# Patient Record
Sex: MALE | Race: WHITE | NOT HISPANIC OR LATINO
[De-identification: names, ages, dates, MRNs, and addresses within clinical notes are randomized per-mention and may not be internally consistent; named-entity substitution may affect disease eponyms.]

---

## 2018-08-24 PROBLEM — Z00.00 ENCOUNTER FOR PREVENTIVE HEALTH EXAMINATION: Status: ACTIVE | Noted: 2018-08-24

## 2018-10-12 ENCOUNTER — APPOINTMENT (OUTPATIENT)
Dept: PULMONOLOGY | Facility: CLINIC | Age: 43
End: 2018-10-12

## 2019-02-05 ENCOUNTER — EMERGENCY (EMERGENCY)
Facility: HOSPITAL | Age: 44
LOS: 1 days | Discharge: HOME | End: 2019-02-05
Attending: EMERGENCY MEDICINE | Admitting: EMERGENCY MEDICINE
Payer: COMMERCIAL

## 2019-02-05 VITALS
HEART RATE: 80 BPM | DIASTOLIC BLOOD PRESSURE: 96 MMHG | RESPIRATION RATE: 17 BRPM | SYSTOLIC BLOOD PRESSURE: 159 MMHG | TEMPERATURE: 98 F | OXYGEN SATURATION: 100 %

## 2019-02-05 PROCEDURE — 99283 EMERGENCY DEPT VISIT LOW MDM: CPT

## 2019-02-05 PROCEDURE — 99283 EMERGENCY DEPT VISIT LOW MDM: CPT | Mod: 25

## 2019-02-05 PROCEDURE — 96372 THER/PROPH/DIAG INJ SC/IM: CPT

## 2019-02-05 RX ORDER — DIAZEPAM 5 MG
1 TABLET ORAL
Qty: 9 | Refills: 0 | OUTPATIENT
Start: 2019-02-05 | End: 2019-02-07

## 2019-02-05 RX ORDER — OXYCODONE AND ACETAMINOPHEN 5; 325 MG/1; MG/1
2 TABLET ORAL ONCE
Qty: 0 | Refills: 0 | Status: DISCONTINUED | OUTPATIENT
Start: 2019-02-05 | End: 2019-02-05

## 2019-02-05 RX ORDER — KETOROLAC TROMETHAMINE 30 MG/ML
60 SYRINGE (ML) INJECTION ONCE
Qty: 0 | Refills: 0 | Status: DISCONTINUED | OUTPATIENT
Start: 2019-02-05 | End: 2019-02-05

## 2019-02-05 RX ORDER — OXYCODONE AND ACETAMINOPHEN 5; 325 MG/1; MG/1
1 TABLET ORAL ONCE
Qty: 0 | Refills: 0 | Status: DISCONTINUED | OUTPATIENT
Start: 2019-02-05 | End: 2019-02-05

## 2019-02-05 RX ORDER — DIAZEPAM 5 MG
5 TABLET ORAL ONCE
Qty: 0 | Refills: 0 | Status: DISCONTINUED | OUTPATIENT
Start: 2019-02-05 | End: 2019-02-05

## 2019-02-05 RX ORDER — DIAZEPAM 5 MG
10 TABLET ORAL ONCE
Qty: 0 | Refills: 0 | Status: DISCONTINUED | OUTPATIENT
Start: 2019-02-05 | End: 2019-02-05

## 2019-02-05 RX ADMIN — Medication 5 MILLIGRAM(S): at 05:45

## 2019-02-05 RX ADMIN — Medication 60 MILLIGRAM(S): at 05:41

## 2019-02-05 RX ADMIN — OXYCODONE AND ACETAMINOPHEN 1 TABLET(S): 5; 325 TABLET ORAL at 05:40

## 2019-02-05 NOTE — ED PROVIDER NOTE - OBJECTIVE STATEMENT
44yo male who presents with back pain for 3 days. pt states he got out of his truck 3 days ago and twisted his back, right sided low back, constant, non radiating, no tingling or weakness, pt saw his chiropractor which made it worse then was seen at urgent care had xr that was normal and given flexeril with no relief. pt has appt with his orthopedist friday but could not wait

## 2019-02-05 NOTE — ED ADULT NURSE NOTE - NSIMPLEMENTINTERV_GEN_ALL_ED
Implemented All Fall Risk Interventions:  [place] to call system. Call bell, personal items and telephone within reach. Instruct patient to call for assistance. Room bathroom lighting operational. Non-slip footwear when patient is off stretcher. Physically safe environment: no spills, clutter or unnecessary equipment. Stretcher in lowest position, wheels locked, appropriate side rails in place. Provide visual cue, wrist band, yellow gown, etc. Monitor gait and stability. Monitor for mental status changes and reorient to person, place, and time. Review medications for side effects contributing to fall risk. Reinforce activity limits and safety measures with patient and family.

## 2019-02-05 NOTE — ED ADULT NURSE NOTE - OBJECTIVE STATEMENT
Pt complains of back pain, recent mechanical slip and fall, and is supposed to see pain clinic this week but can't wait. States he has a history of back pain.

## 2019-02-05 NOTE — ED ADULT TRIAGE NOTE - CHIEF COMPLAINT QUOTE
patient states "I twisted my back getting out of my truck sunday and went to my chiropractor after it happened and monday too, and I went into a walkin clinic and they gave me a muscle relaxer, and I have an appointment friday for pain management but I cant wait."

## 2019-03-13 PROBLEM — M54.9 DORSALGIA, UNSPECIFIED: Chronic | Status: ACTIVE | Noted: 2019-02-05

## 2019-03-24 ENCOUNTER — TRANSCRIPTION ENCOUNTER (OUTPATIENT)
Age: 44
End: 2019-03-24

## 2019-03-25 ENCOUNTER — OUTPATIENT (OUTPATIENT)
Dept: OUTPATIENT SERVICES | Facility: HOSPITAL | Age: 44
LOS: 1 days | End: 2019-03-25
Payer: COMMERCIAL

## 2019-03-25 DIAGNOSIS — M47.896 OTHER SPONDYLOSIS, LUMBAR REGION: ICD-10-CM

## 2019-03-25 PROCEDURE — 64494 INJ PARAVERT F JNT L/S 2 LEV: CPT | Mod: 50

## 2019-03-25 PROCEDURE — 64493 INJ PARAVERT F JNT L/S 1 LEV: CPT | Mod: 50

## 2019-03-25 PROCEDURE — 76000 FLUOROSCOPY <1 HR PHYS/QHP: CPT

## 2019-03-28 NOTE — ED ADULT NURSE NOTE - NSFALLRSKPASTHIST_ED_ALL_ED
Patient received 1g of intramuscular Ceftriaxone in ER.   Patient will need to complete an outpatient course of UTI treatment (Discussed with Dr. Gonsalez)  Return to the ER if patient develops any new or worsening symptoms such as fever, chills, abdominal pain, confusion, chest pain, SOB, numbness, weakness, nausea, vomiting, or visual changes.
yes

## 2019-12-11 ENCOUNTER — APPOINTMENT (OUTPATIENT)
Dept: PULMONOLOGY | Facility: CLINIC | Age: 44
End: 2019-12-11

## 2021-06-28 ENCOUNTER — RESULT REVIEW (OUTPATIENT)
Age: 46
End: 2021-06-28

## 2021-08-26 ENCOUNTER — RESULT REVIEW (OUTPATIENT)
Age: 46
End: 2021-08-26

## 2022-08-15 NOTE — ED ADULT NURSE NOTE - RADIATION
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

## 2022-11-17 ENCOUNTER — APPOINTMENT (OUTPATIENT)
Dept: ORTHOPEDIC SURGERY | Facility: CLINIC | Age: 47
End: 2022-11-17

## 2022-11-17 VITALS — BODY MASS INDEX: 25.48 KG/M2 | WEIGHT: 172 LBS | HEIGHT: 69 IN

## 2022-11-17 DIAGNOSIS — F17.210 NICOTINE DEPENDENCE, CIGARETTES, UNCOMPLICATED: ICD-10-CM

## 2022-11-17 DIAGNOSIS — F39 UNSPECIFIED MOOD [AFFECTIVE] DISORDER: ICD-10-CM

## 2022-11-17 DIAGNOSIS — Z86.69 PERSONAL HISTORY OF OTHER DISEASES OF THE NERVOUS SYSTEM AND SENSE ORGANS: ICD-10-CM

## 2022-11-17 DIAGNOSIS — R45.4 IRRITABILITY AND ANGER: ICD-10-CM

## 2022-11-17 PROCEDURE — 73010 X-RAY EXAM OF SHOULDER BLADE: CPT | Mod: LT

## 2022-11-17 PROCEDURE — 20611 DRAIN/INJ JOINT/BURSA W/US: CPT

## 2022-11-17 PROCEDURE — 99213 OFFICE O/P EST LOW 20 MIN: CPT | Mod: 25

## 2022-11-17 PROCEDURE — J3490M: CUSTOM

## 2022-11-17 PROCEDURE — 73030 X-RAY EXAM OF SHOULDER: CPT | Mod: LT

## 2022-11-17 RX ORDER — METHYLPREDNISOLONE 4 MG/1
4 TABLET ORAL
Qty: 1 | Refills: 0 | Status: ACTIVE | COMMUNITY
Start: 2022-11-17 | End: 1900-01-01

## 2022-11-17 RX ORDER — CEFDINIR 300 MG/1
300 CAPSULE ORAL
Qty: 14 | Refills: 0 | Status: ACTIVE | COMMUNITY
Start: 2022-09-21

## 2022-11-17 RX ORDER — METHOCARBAMOL 750 MG/1
750 TABLET, FILM COATED ORAL
Qty: 40 | Refills: 0 | Status: ACTIVE | COMMUNITY
Start: 2022-11-10

## 2022-11-17 RX ORDER — ALPRAZOLAM 0.5 MG/1
0.5 TABLET ORAL
Qty: 30 | Refills: 0 | Status: ACTIVE | COMMUNITY
Start: 2022-11-14

## 2022-11-17 RX ORDER — PERPHENAZINE 2 MG/1
2 TABLET ORAL
Qty: 60 | Refills: 0 | Status: ACTIVE | COMMUNITY
Start: 2022-07-29

## 2022-11-17 RX ORDER — LITHIUM CARBONATE 300 MG/1
300 CAPSULE ORAL
Qty: 60 | Refills: 0 | Status: ACTIVE | COMMUNITY
Start: 2022-11-07

## 2022-11-17 RX ORDER — GUANFACINE 1 MG/1
1 TABLET ORAL
Qty: 30 | Refills: 0 | Status: ACTIVE | COMMUNITY
Start: 2022-09-27

## 2022-11-17 RX ORDER — DIVALPROEX SODIUM 500 MG/1
500 TABLET, DELAYED RELEASE ORAL
Qty: 90 | Refills: 0 | Status: ACTIVE | COMMUNITY
Start: 2022-11-07

## 2022-11-17 RX ORDER — OXYCODONE AND ACETAMINOPHEN 5; 325 MG/1; MG/1
5-325 TABLET ORAL
Qty: 42 | Refills: 0 | Status: ACTIVE | COMMUNITY
Start: 2022-09-21

## 2022-11-17 RX ORDER — GUANFACINE 2 MG/1
2 TABLET, EXTENDED RELEASE ORAL
Qty: 30 | Refills: 0 | Status: ACTIVE | COMMUNITY
Start: 2022-11-07

## 2022-11-17 NOTE — HISTORY OF PRESENT ILLNESS
[7] : 7 [Throbbing] : throbbing [Tightness] : tightness [Constant] : constant [Full time] : Work status: full time [de-identified] : 11/17/22: 48 yo RHD male with left shoulder pain since 11/10/22. Denies specfic injury. He woke up with severe pain. He has trouble raising his arm. He went to  and was given muscle relaxer with minimal relief. He had increased pain and went to ED. He still cannot move his arm without pain. No prior injuries. [] : no [FreeTextEntry1] : L shoulder [FreeTextEntry5] : no injury

## 2022-11-17 NOTE — ASSESSMENT
[FreeTextEntry1] : L calcific tendonitis.\par Activity modification.\par Ice.\par L SA injection given.\par MDP\par RTO 3-4 weeks.\par \par \par Procedure Note:\par Large Joint Injection was performed because of pain and inflammation, failure of conservative treatment.  \par Medications:\par Depo-Medrol: 1 cc, 80 mg.\par Lidocaine: 2 cc, 1%. \par Marcaine: 2 cc, .25%. \par \par Medication was injected in the left subacromial space. Patient has tried OTC's including aspirin, Ibuprofen, Aleve etc or prescription NSAIDs, and/or exercises at home and/ or physical therapy without satisfactory response. The risks, benefits, and alternatives to cortisone injection were explained in full to the patient. Risks outlined include but are not limited to infection, sepsis, bleeding, scarring, skin discoloration, temporary increase in pain, syncopal episode, failure to resolve symptoms, allergic reaction, symptom recurrence, and elevation of blood sugar in diabetics. Patient understood the risks. All questions were answered. After discussion of options, patient requested an injection. Oral informed consent was obtained and sterile prep of the injection site was performed using alcohol. Sterile technique was utilized for the procedure including the preparation of the solutions used for the injection. Ethyl chloride spray was used topically.  Sterile technique used. Patient tolerated procedure well. Post Procedure Instructions: Patient was advised to call if redness, pain, or fever occur and apply ice for 15 min. out of every hour for the next 12-24 hours as tolerated. patient was advised to rest the joint(s) for 2 days.\par \par Ultrasound Guidance was used for the following reasons: for prior failure or difficult injection and to visualize tearing and inflammation.\par Ultrasound guided injection was performed of the shoulder, visualization of the needle and placement of injection was performed without complication.\par

## 2022-12-01 ENCOUNTER — APPOINTMENT (OUTPATIENT)
Dept: ORTHOPEDIC SURGERY | Facility: CLINIC | Age: 47
End: 2022-12-01

## 2022-12-01 VITALS — HEIGHT: 69 IN | BODY MASS INDEX: 25.48 KG/M2 | WEIGHT: 172 LBS

## 2022-12-01 DIAGNOSIS — Z78.9 OTHER SPECIFIED HEALTH STATUS: ICD-10-CM

## 2022-12-01 PROCEDURE — 99213 OFFICE O/P EST LOW 20 MIN: CPT

## 2022-12-01 PROCEDURE — 72170 X-RAY EXAM OF PELVIS: CPT

## 2022-12-01 PROCEDURE — 72100 X-RAY EXAM L-S SPINE 2/3 VWS: CPT

## 2022-12-01 PROCEDURE — 73590 X-RAY EXAM OF LOWER LEG: CPT | Mod: RT

## 2022-12-01 RX ORDER — METHYLPREDNISOLONE 4 MG/1
4 TABLET ORAL
Qty: 1 | Refills: 0 | Status: ACTIVE | COMMUNITY
Start: 2022-12-01 | End: 1900-01-01

## 2022-12-01 RX ORDER — GABAPENTIN 100 MG/1
100 CAPSULE ORAL
Qty: 60 | Refills: 0 | Status: ACTIVE | COMMUNITY
Start: 2022-12-01 | End: 1900-01-01

## 2022-12-01 NOTE — DISCUSSION/SUMMARY
[de-identified] : Pain is likely radicular, will start MDP, Gabapentin for the night pain, f/u 10 days

## 2022-12-01 NOTE — HISTORY OF PRESENT ILLNESS
[Right Leg] : right leg [8] : 8 [5] : 5 [Dull/Aching] : dull/aching [Localized] : localized [Throbbing] : throbbing [Meds] : meds [Standing] : standing [Walking] : walking [de-identified] : 12-1-22- He states 5 day history of pain lateral righ lower leg radiating from knee to ankle. denies injury. Pain worse at night. He has been limping. denies specific back pain [] : no [FreeTextEntry3] : 5 days [FreeTextEntry5] : pt stated he has been having constant pain in his right calf with nni

## 2022-12-01 NOTE — IMAGING
[de-identified] : right lower leg- burning pain from proximal tibia region laterally to the ankle, no calf swelling or ttp,  [FreeTextEntry1] : straightening and narrowing 4-5 and 5-1

## 2022-12-01 NOTE — PHYSICAL EXAM
[Flexion] : flexion [Extension] : extension [Bending to left] : bending to left [Bending to right] : bending to right [Disc space narrowing] : Disc space narrowing [Scoliosis] : Scoliosis [AP] : anteroposterior [Right] : right tibia/fibula [There are no fractures, subluxations or dislocations. No significant abnormalities are seen] : There are no fractures, subluxations or dislocations. No significant abnormalities are seen [] : negative straight leg raise

## 2022-12-06 ENCOUNTER — APPOINTMENT (OUTPATIENT)
Dept: MRI IMAGING | Facility: CLINIC | Age: 47
End: 2022-12-06

## 2022-12-06 ENCOUNTER — APPOINTMENT (OUTPATIENT)
Dept: ORTHOPEDIC SURGERY | Facility: CLINIC | Age: 47
End: 2022-12-06

## 2022-12-06 PROCEDURE — 99213 OFFICE O/P EST LOW 20 MIN: CPT

## 2022-12-06 NOTE — IMAGING
[de-identified] : right lower leg- burning pain from proximal tibia region laterally to the ankle, no calf swelling or ttp,  [FreeTextEntry1] : straightening and narrowing 4-5 and 5-1

## 2022-12-06 NOTE — PHYSICAL EXAM
[Flexion] : flexion [Extension] : extension [Bending to left] : bending to left [Bending to right] : bending to right [Disc space narrowing] : Disc space narrowing [Scoliosis] : Scoliosis [AP] : anteroposterior [Right] : right tibia/fibula [There are no fractures, subluxations or dislocations. No significant abnormalities are seen] : There are no fractures, subluxations or dislocations. No significant abnormalities are seen [Diminished] : patella reflex diminished [] : negative straight leg raise [de-identified] : weakness Right hip flexion, slr resisted 3/5 [de-identified] : he cannot heel toe rise on the right

## 2022-12-06 NOTE — ASSESSMENT
[FreeTextEntry1] : L calcific tendonitis.\par Activity modification.\par Ice.\par L SA injection given 11/17/22.\par MDP\par RTO 3-4 weeks.\par \par

## 2022-12-06 NOTE — HISTORY OF PRESENT ILLNESS
[7] : 7 [Throbbing] : throbbing [Tightness] : tightness [Constant] : constant [Full time] : Work status: full time [de-identified] : Error [] : no [FreeTextEntry1] : L shoulder

## 2022-12-06 NOTE — DISCUSSION/SUMMARY
[de-identified] : Radicular RLE pain increased, RLE weakness, no relief with MDP and Gabapentin\par STAT MRI Lumbar spine, evaluate cord compression/stenosis\par Return with pain mgmt for review and possible spine injections

## 2022-12-06 NOTE — HISTORY OF PRESENT ILLNESS
[Right Leg] : right leg [8] : 8 [5] : 5 [Dull/Aching] : dull/aching [Localized] : localized [Throbbing] : throbbing [Meds] : meds [Standing] : standing [Walking] : walking [de-identified] : 12/6/22- here for follow up radicular RLE pain and symptoms, increased. No relief with Gabapentin 900 mg bid and MDP. Tylenol prn has not been helpful.  Pain to FWB RLE. R leg weakness. Pain with B/B. Difficulty sitting, standing and lying short periods. There are night symptoms. Similar episode 5 years ago.  He saw Dr. Yarely coyle mgmt, multiple BRUNA's and ablations were done. 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 denies injury. Pain worse at night. He has been limping. denies specific back pain [] : no [FreeTextEntry3] : 5 days [FreeTextEntry5] : pt stated he has been having constant pain in his right calf with nni

## 2022-12-07 ENCOUNTER — APPOINTMENT (OUTPATIENT)
Dept: PAIN MANAGEMENT | Facility: CLINIC | Age: 47
End: 2022-12-07

## 2022-12-07 ENCOUNTER — RESULT REVIEW (OUTPATIENT)
Age: 47
End: 2022-12-07

## 2022-12-07 VITALS — BODY MASS INDEX: 25.48 KG/M2 | HEIGHT: 69 IN | WEIGHT: 172 LBS

## 2022-12-07 PROCEDURE — 99204 OFFICE O/P NEW MOD 45 MIN: CPT

## 2022-12-07 NOTE — HISTORY OF PRESENT ILLNESS
[Right Leg] : right leg [10] : 10 [Sharp] : sharp [Throbbing] : throbbing [Constant] : constant [Sleep] : sleep [Rest] : rest [Meds] : meds [Nothing helps with pain getting better] : Nothing helps with pain getting better [Sitting] : sitting [Walking] : walking [FreeTextEntry1] : 12/07/2022 : Patient presents for initial evaluation. He c/o pain in the right leg for the last 3 weeks or so.  Has years of lower back pain and had injections with Dr. Pritchett. This pain is new. Medrol did not help. gabapentin did not help. pain down the right lateral leg.  \par \par Subjective Weakness: No\par Numbness/Tingling: Yes\par Bladder/Bowel dysfunction: No\par Physical Therapy: No\par \par \par Attempted modalities for current pain complaint:\par See above:\par Medications: No\par \par Injections: Yes-> Dr. Pritchett Had BRUNA's and RFA's. (last seen a year ago)\par \par Previous Spine Surgery: N/A\par \par Imaging:\par MRI Lumbar Spine (12/7/22) IMPRESSION: Grade 1 retrolisthesis of L2 on L3, L3 on L4 and L5 on S1.\par \par Interval progression of Modic type I endplate changes at L2-L3 and L4 on L5.\par \par Stable disc bulges contributing to mild to moderate spinal canal stenosis and\par mild to moderate neural foraminal narrowing most pronounced at L3-L4 on the\par right.\par \par   [] : no [FreeTextEntry7] : down to the ankle  [de-identified] : MRI lumbar 12/7/22

## 2022-12-07 NOTE — PHYSICAL EXAM
[Extension] : extension [4___] : right hip flexion 4[unfilled]/5 [] : positive sitting straight leg raise [TWNoteComboBox7] : forward flexion 75 degrees [de-identified] : extension 5 degrees

## 2022-12-07 NOTE — ASSESSMENT
[FreeTextEntry1] : After discussing various treatment options with the patient including but not limited to oral medications, physical therapy, exercise, modalities as well as interventional spinal injections, we have decided with the following plan:\par \par 1) Intervention Injection Therapy:\par I personally reviewed the MRI/CT scan images and agree with the radiologist's report. The radiological findings were discussed with the patient.\par The risks, benefits, contents and alternatives to injection were explained in full to the patient. Risks outlined include but are not limited to infection,sepsis, bleeding, post-dural puncture headache, nerve damage, temporary increase in pain, syncopal episode, failure to resolve symptoms, allergic reaction, symptom recurrence, and elevation of blood sugar in diabetics. Cortisone may cause immunosuppression. Patient understands the risks. All questions were answered. After discussion of options, patient requested an injection. Information regarding the injection was given to the patient. Which medications to stop prior to the injection was explained to the patient as well.\par \par Follow up in 1-2 weeks post injection for re-evaluation. \par Continue Home exercises, stretching, activity modification, physical therapy, and conservative care.\par \par Patient is presenting with acute/sub-acute radicular pain with impairment in ADLs and functionality.  The pain has not responded to  conservative care including nsaid therapy and/or physical therapy.  There is no bleeding tendency, unstable medical condition, or systemic infection. \par \par right L3/4 L4/5 TFESI\par \par \par \par

## 2022-12-12 ENCOUNTER — APPOINTMENT (OUTPATIENT)
Dept: PAIN MANAGEMENT | Facility: CLINIC | Age: 47
End: 2022-12-12

## 2022-12-12 PROCEDURE — 64484 NJX AA&/STRD TFRM EPI L/S EA: CPT | Mod: 59,RT

## 2022-12-12 PROCEDURE — 64483 NJX AA&/STRD TFRM EPI L/S 1: CPT | Mod: RT

## 2022-12-12 NOTE — PROCEDURE
[FreeTextEntry3] : Date of Service: 12/12/2022 \par \par Account: [id number]\par \par Patient: DAYSI BURNETT \par \par YOB: 1975\par \par Age: 47 year\par \par \par Surgeon:  Ronit Vyas M.D.\par \par Assistant: None.\par \par Pre-Operative Diagnosis: Lumbosacral Radiculitis (M54.17)\par \par Post Operative Diagnosis: Lumbosacral Radiculitis (M54.17)\par \par Procedure: Right L3-4, L4-5 transforaminal epidural steroid injection under fluoroscopic guidance.\par \par Anesthesia:                 MAC\par \par \par This procedure was carried out using fluoroscopic guidance.  The risks and benefits of the procedure were discussed extensively with the patient.  The consent of the patient was obtained and the following procedure was performed. The patient was placed in the prone position on the fluoroscopic table and the lumbar area was prepped and draped in a sterile fashion.\par \par The right L4-5 neural foramen was identified on right oblique  "steve dog" anatomical view at the 6 o' clock position using fluoroscopic guidance, and the area was marked. The overlying skin and subcutaneous structures were anesthetized using sterile technique with 1% Lidocaine.  A 22 gauge spinal needle was directed toward the inferior (6 o'clock) position of the pedicle, which formed the roof of the identified foramen.  Once in the epidural space, after negative aspiration for heme and CSF, 1cc of Omnipaque contrast was injected to confirm epidural location and assess filling defects and rule out intravascular needle placement. \par \par Lumbar Epidurogram showed no intravascular or intrathecal flow pattern.  No blood or CSF was aspirated. Omnipaque spread medially in epidural space and  outlined the exiting nerve root.  \par \par After this, an injectate of 3 cc preservative free normal saline plus 40 mg of Kenalog was injected in the epidural space.\par \par The right L3-4 neural foramen was then identified on right oblique  "steve dog" anatomical view at the 6 o' clock position using fluoroscopic guidance, and the area was marked. The overlying skin and subcutaneous structures were anesthetized using sterile technique with 1% Lidocaine.  A 22 gauge spinal needle was directed toward the inferior (6 o'clock) position of the pedicle, which formed the roof of the identified foramen.  Once in the epidural space, after negative aspiration for heme and CSF, 1cc of Omnipaque contrast was injected to confirm epidural location and assess filling defects and rule out intravascular needle placement. \par \par Lumbar Epidurogram showed no intravascular or intrathecal flow pattern.  No blood or CSF was aspirated. Omnipaque spread medially in epidural space and  outlined the exiting nerve root.  \par \par After this, an injectate of 3 cc preservative free normal saline plus 40 mg of Kenalog was injected in the epidural space.\par \par The needle was subsequently removed.  Vital signs remained normal.  Pulse oximeter was used throughout the procedure and the patient's pulse and oxygen saturation remained within normal limits.  The patient tolerated the procedure well.  There were no complications.  The patient was instructed to apply ice over the injection sites for twenty minutes every two hours for the next 24 to 48 hours.  The patient was also instructed to contact me immediately if there were any problems.\par \par Ronit Vyas M.D.

## 2022-12-15 ENCOUNTER — APPOINTMENT (OUTPATIENT)
Age: 47
End: 2022-12-15

## 2022-12-28 ENCOUNTER — APPOINTMENT (OUTPATIENT)
Dept: PAIN MANAGEMENT | Facility: CLINIC | Age: 47
End: 2022-12-28

## 2022-12-28 VITALS — WEIGHT: 172 LBS | BODY MASS INDEX: 25.48 KG/M2 | HEIGHT: 69 IN

## 2022-12-28 PROCEDURE — 99214 OFFICE O/P EST MOD 30 MIN: CPT

## 2022-12-28 RX ORDER — OXYCODONE AND ACETAMINOPHEN 5; 325 MG/1; MG/1
5-325 TABLET ORAL
Qty: 28 | Refills: 0 | Status: ACTIVE | COMMUNITY
Start: 2022-12-28 | End: 1900-01-01

## 2022-12-28 NOTE — HISTORY OF PRESENT ILLNESS
[Right Leg] : right leg [10] : 10 [Sharp] : sharp [Throbbing] : throbbing [Constant] : constant [Sleep] : sleep [Rest] : rest [Meds] : meds [Nothing helps with pain getting better] : Nothing helps with pain getting better [Sitting] : sitting [Walking] : walking [Lower back] : lower back [6] : 6 [Injection therapy] : injection therapy [FreeTextEntry1] : 12/28/22: follow up today after Right L3-4,L4-5 TFESI on 12/12/22 with 50% relief. Pain still down the leg. Pain worse at night. Advised him that the he can not take so many Tylenol.  He is adamant that he did not fill 160 pills.  \par \par 12/07/2022 : Patient presents for initial evaluation. He c/o pain in the right leg for the last 3 weeks or so.  Has years of lower back pain and had injections with Dr. Pritchett. This pain is new. Medrol did not help. gabapentin did not help. pain down the right lateral leg.  \par \par Subjective Weakness: No\par Numbness/Tingling: Yes\par Bladder/Bowel dysfunction: No\par Physical Therapy: No\par \par \par Attempted modalities for current pain complaint:\par See above:\par Medications: No\par \par Injections: Yes-> Dr. Pritchett Had BRUNA's and RFA's. (last seen a year ago)\par \par Previous Spine Surgery: N/A\par \par Imaging:\par MRI Lumbar Spine (12/7/22) IMPRESSION: Grade 1 retrolisthesis of L2 on L3, L3 on L4 and L5 on S1.\par \par Interval progression of Modic type I endplate changes at L2-L3 and L4 on L5.\par \par Stable disc bulges contributing to mild to moderate spinal canal stenosis and\par mild to moderate neural foraminal narrowing most pronounced at L3-L4 on the\par right.\par \par   [] : no [FreeTextEntry7] : down to the ankle  [FreeTextEntry9] : Tylenol [de-identified] : MRI lumbar 12/7/22

## 2022-12-28 NOTE — PHYSICAL EXAM
[Extension] : extension [4___] : right hip flexion 4[unfilled]/5 [] : no palpable masses [TWNoteComboBox7] : forward flexion 75 degrees [de-identified] : extension 5 degrees

## 2022-12-28 NOTE — ASSESSMENT
[FreeTextEntry1] : After discussing various treatment options with the patient including but not limited to oral medications, physical therapy, exercise, modalities as well as interventional spinal injections, we have decided with the following plan:\par \par 1) Intervention Injection Therapy:\par I personally reviewed the MRI/CT scan images and agree with the radiologist's report. The radiological findings were discussed with the patient.\par The risks, benefits, contents and alternatives to injection were explained in full to the patient. Risks outlined include but are not limited to infection,sepsis, bleeding, post-dural puncture headache, nerve damage, temporary increase in pain, syncopal episode, failure to resolve symptoms, allergic reaction, symptom recurrence, and elevation of blood sugar in diabetics. Cortisone may cause immunosuppression. Patient understands the risks. All questions were answered. After discussion of options, patient requested an injection. Information regarding the injection was given to the patient. Which medications to stop prior to the injection was explained to the patient as well.\par \par Follow up in 1-2 weeks post injection for re-evaluation. \par Continue Home exercises, stretching, activity modification, physical therapy, and conservative care.\par \par Patient is presenting with acute/sub-acute radicular pain with impairment in ADLs and functionality.  The pain has not responded to  conservative care including nsaid therapy and/or physical therapy.  There is no bleeding tendency, unstable medical condition, or systemic infection. \par \par right L3/4 L4/5 TFESI\par \par 2) Spine surgeon referral \par \par \par \par

## 2022-12-29 ENCOUNTER — APPOINTMENT (OUTPATIENT)
Dept: ORTHOPEDIC SURGERY | Facility: CLINIC | Age: 47
End: 2022-12-29
Payer: COMMERCIAL

## 2022-12-29 VITALS — WEIGHT: 168 LBS | BODY MASS INDEX: 24.88 KG/M2 | HEIGHT: 69 IN

## 2022-12-29 PROCEDURE — 99215 OFFICE O/P EST HI 40 MIN: CPT

## 2022-12-29 NOTE — ASSESSMENT
[FreeTextEntry1] : 47 M with LBP and RLE radic L4/5 with findings of lateral recess and foraminal stenosis From L3-S1. Patient as failed PT, BRUNA, and pain medication.  Discussed risks benefits and alternatives to surgery. Discussed the risks and benefits of surgery including bu not limited to anesthesia complications, bleeding, infection, persistent symptoms, durotomy, spinal fluid leak.  \par \par PLan for Right hemilaminotomy/foraminotomy L3- S1\par \par Discussed that he will reach out to his PMD for medical clearance.\par Surgical dao will reach out to him regarding scheduling.

## 2022-12-29 NOTE — HISTORY OF PRESENT ILLNESS
[Lower back] : lower back [6] : 6 [Sharp] : sharp [Shooting] : shooting [Throbbing] : throbbing [Tightness] : tightness [Constant] : constant [Household chores] : household chores [Leisure] : leisure [Work] : work [Sleep] : sleep [Meds] : meds [Lying in bed] : lying in bed [Full time] : Work status: full time [de-identified] : 12/29/22: 48 y/o M presenting with low back pain States pain goes down the right leg to the top of the foot. Has had LESI and nerve ablation. Had LESI 2 weeks prior. Last BRUNA got 40% relieve. Last ablation one year prior. Has done PT in the past. Has taken percocet with temporary relief.  Has had choreic low back pain for approximately 10 years tried Facet injections/MBB ablations iwt no relief.  Recently the right sided sciatica began and is much worse than his back pain .Pain radiates lateral thigh over anterior shin to  top of foot.  No bowel or baldder symptoms. No let sided symptoms. Is scheduled for trial of second BRUNA with Dr. Vyas 1/5. [] : no [FreeTextEntry5] : patient states he saw had first TFESI on 12/22/22 with 40% relief. He is schedule for the second TFESI on 1/5/23. HE would like to discuss surgery options today. 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

## 2022-12-29 NOTE — IMAGING
[de-identified] : General:\par Alignment: normal\par Spinous process: no tenderness\par Paraspinal tenderness: No tenderness\par Range of motion: full and pain free\par Scoliosis: none\par \par Strength: \par Hip Flexors: 5/5 b/l\par Quadriceps: 5/5 b/l\par Dorsi/Plantor flexion: 5/5 b/l\par EHL: 5/5 b/l\par \par Sensation: Intact b/l\par Straight Leg Raise: + RLE\par \par MRI L spine [facility]\par Grade 1 retrolisthesis of L2 on L3, L3 on L4 and L5 on S1.\par Interval progression of Modic type I endplate changes at L2-L3 and L4 on L5.\par Stable disc bulges contributing to mild to moderate spinal canal stenosis and\par mild to moderate neural foraminal narrowing most pronounced at L3-L4 on the\par right\par \par MRI L spine from [facility] inteprted indpednetly today.  \par L3-S1 latearl recess and foraminal stenosis on right. From broad based disc bulges.

## 2023-01-03 ENCOUNTER — NON-APPOINTMENT (OUTPATIENT)
Age: 48
End: 2023-01-03

## 2023-01-05 ENCOUNTER — APPOINTMENT (OUTPATIENT)
Age: 48
End: 2023-01-05

## 2023-01-18 ENCOUNTER — APPOINTMENT (OUTPATIENT)
Dept: PAIN MANAGEMENT | Facility: CLINIC | Age: 48
End: 2023-01-18

## 2023-01-19 ENCOUNTER — APPOINTMENT (OUTPATIENT)
Dept: ORTHOPEDIC SURGERY | Facility: CLINIC | Age: 48
End: 2023-01-19
Payer: COMMERCIAL

## 2023-01-19 DIAGNOSIS — M75.32 CALCIFIC TENDINITIS OF LEFT SHOULDER: ICD-10-CM

## 2023-01-19 PROCEDURE — 99214 OFFICE O/P EST MOD 30 MIN: CPT

## 2023-01-19 NOTE — ASSESSMENT
[FreeTextEntry1] : L calcific tendonitis.\par L SA injection given 11/17/23, good temporary relief.\par Now with some weakness in ER. \par Will get MRI to eval RCT.\par Consider U/S Asp/inj. \par RTO after MRI.\par \par

## 2023-01-19 NOTE — HISTORY OF PRESENT ILLNESS
[Teriparatide/Abaloparatide Therapy] : Risks and benefits of Teriparatide/Abaloparatide therapy were discussed with the patient including potential risk of osteosarcoma [7] : 7 [Other____] : [unfilled] [FreeTextEntry1] : Ms. ENGLAND is a 74 year old female w/ osteoporosis\par \par Pt states she was told of osteoporosis many years ago. She initially treated w/ Fosamax. She then started Prolia ~2017 from Dr. Yumi Birmingham. Tolerated well. Last Prolia dose 5/2021. Last DDS within past 6 months. She was told of bone spicule by Dr. Birmingham 11/2021 after pt saw DDS, which was removed and healed after ~1 week by pt report. H/o presumable seizure, resulting in tibia/fibia fx ~2012. H/o T12 compression fx after fall from standing height. H/o multiple strokes, she is on chronic prednisone from neurology. No unusual risks for osteoporosis. 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  The patient was advised that she cannot simply stop the Prolia due to risk of rapid bone loss and additional compression fractures.  Alternative therapies were discussed in great detail.\par  . Options for medical therapy discussed in detail. I discussed bone anabolic therapy w/ Forteo and Tymlos for ~6 months until the bone spicule in jaw is fully healed then transition back to Prolia. Risks and benefits discussed including blood Ca elevation, lightheadedness, palpitations, or dizziness. In animal models long term use has shown increased risk for bone CA, though never seen in humans. Pt would be on this treatment for <2 years followed by another osteoporosis rx to prevent rapid bone loss. All questions were answered. Rx information handout provided.\par The patient is not a candidate for estrogen or Evista due to history of deep vein thrombosis.  I prefer not to consider Evenity due to concerns about risk of cardiovascular disease given her previous CVA.\par  Pt declines anabolic therapy.  She is willing to sttart Miacalcin. Medication instructions reviewed. Prescription sent out.  Patient advised that Miacalcin nasal spray is a fairly weak antiresorptive therapy.  There is no specific research data on use of Miacalcin after Prolia but this should allow us to delay the next Prolia dose for approximately 6 months.\par \par Discussed ADA guidelines regarding osteoporosis rx, ONJ, and oral surgery. Data to include suggestion of the resumption of osteoporosis rx after ONJ. Michelle BAI, Mary JT, Ghulam N, Papguillermo S, Carline PW, Mishel X, Josselyn A, Sara RB, Cary M. Invasive Oral Procedures and Events in Postmenopausal Women With Osteoporosis Treated With Denosumab for Up to 10 Years. J Clin Endocrinol Metab. 2019 Jun 1;104(6):9999-9112. \par \par I recommend pt take no more than 500 mg Ca in addition to dietary intake and Vitamin D 1000 IU daily.\par \par Labs 5/2021 reviewed: Ca 9.7, normal. Vitamin D 44.6, normal. Creatinine 0.88, normal.\par \par F/u in 3 months\par \par Pharmacological Management of Osteoporosis in Postmenopausal Women: An Endocrine Society  Clinical Practice Guideline. Fabiola R, Loli RAMIREZ., Ajith DM, Danitza AM, Yelitza MH, Temo D. JCEM 2019 104: [id number] [Throbbing] : throbbing [Tightness] : tightness [Constant] : constant [Full time] : Work status: full time [de-identified] : 1/19/23: Here for follow up. He state injection helped but still has pain when reaching overhead. There is some weakness.\par \par 11/17/22: 46 yo RHD male with left shoulder pain since 11/10/22. Denies specfic injury. He woke up with severe pain. He has trouble raising his arm. He went to  and was given muscle relaxer with minimal relief. He had increased pain and went to ED. He still cannot move his arm without pain. No prior injuries. [] : no [FreeTextEntry1] : L shoulder [FreeTextEntry5] : no injury

## 2023-01-19 NOTE — HISTORY OF PRESENT ILLNESS
[7] : 7 [Throbbing] : throbbing [Tightness] : tightness [Constant] : constant [Full time] : Work status: full time [de-identified] : 1/19/23: Here for follow up. He state injection helped but still has pain when reaching overhead. There is some weakness.\par \par 11/17/22: 48 yo RHD male with left shoulder pain since 11/10/22. Denies specfic injury. He woke up with severe pain. He has trouble raising his arm. He went to  and was given muscle relaxer with minimal relief. He had increased pain and went to ED. He still cannot move his arm without pain. No prior injuries. [] : no [FreeTextEntry1] : L shoulder [FreeTextEntry5] : no injury

## 2023-01-19 NOTE — PHYSICAL EXAM
[Left] : left shoulder [] : pain with strength testing [4 ___] : forward flexion 4[unfilled]/5 [4___] : external rotation 4[unfilled]/5 [5___] : internal rotation 5[unfilled]/5 [FreeTextEntry9] : FE 130P

## 2023-01-26 ENCOUNTER — APPOINTMENT (OUTPATIENT)
Dept: ORTHOPEDIC SURGERY | Facility: HOSPITAL | Age: 48
End: 2023-01-26

## 2023-01-30 ENCOUNTER — RESULT REVIEW (OUTPATIENT)
Age: 48
End: 2023-01-30

## 2023-06-02 ENCOUNTER — APPOINTMENT (OUTPATIENT)
Dept: ORTHOPEDIC SURGERY | Facility: CLINIC | Age: 48
End: 2023-06-02
Payer: COMMERCIAL

## 2023-06-02 VITALS — HEIGHT: 69 IN | WEIGHT: 177 LBS | BODY MASS INDEX: 26.22 KG/M2

## 2023-06-02 PROCEDURE — 20610 DRAIN/INJ JOINT/BURSA W/O US: CPT

## 2023-06-02 PROCEDURE — 73562 X-RAY EXAM OF KNEE 3: CPT | Mod: RT

## 2023-06-02 PROCEDURE — 99213 OFFICE O/P EST LOW 20 MIN: CPT | Mod: 25

## 2023-06-02 RX ORDER — GABAPENTIN 300 MG/1
300 CAPSULE ORAL
Qty: 30 | Refills: 0 | Status: ACTIVE | COMMUNITY
Start: 2023-06-02 | End: 1900-01-01

## 2023-06-02 NOTE — PHYSICAL EXAM
[NL (0)] : extension 0 degrees [5___] : hamstring 5[unfilled]/5 [Negative] : negative Maria Luisa's [Right] : right knee [AP] : anteroposterior [Lateral] : lateral [There are no fractures, subluxations or dislocations. No significant abnormalities are seen] : There are no fractures, subluxations or dislocations. No significant abnormalities are seen [] : negative Lachmann [TWNoteComboBox7] : flexion 125 degrees

## 2023-06-02 NOTE — DISCUSSION/SUMMARY
[de-identified] : It is unclear where his pain is coming from. Will inject knee with cortisone, give Gabapentin for night time use in case it is nerve

## 2023-06-02 NOTE — HISTORY OF PRESENT ILLNESS
[Gradual] : gradual [Constant] : constant [de-identified] : Has anterior knee pain past few weeks, avi bad at night when trying to go to sleep. No injury. Did have recent back surgery for HNP with right sciatica, but this pain is different than the sciatica he had. No clicking, locking or giving way [] : no [FreeTextEntry1] : right knee [FreeTextEntry3] : last week [FreeTextEntry5] : patient has been feeling increasing pain and weakness  [FreeTextEntry6] : numbness/weakness

## 2023-06-02 NOTE — PROCEDURE
[Large Joint Injection] : Large joint injection [Right] : of the right [Knee] : knee [Pain] : pain [Alcohol] : alcohol [Betadine] : betadine [Ethyl Chloride sprayed topically] : ethyl chloride sprayed topically [Sterile technique used] : sterile technique used [___ cc    3mg] :  Betamethasone (Celestone) ~Vcc of 3mg [___ cc    1%] : Lidocaine ~Vcc of 1%

## 2023-06-30 ENCOUNTER — APPOINTMENT (OUTPATIENT)
Dept: ORTHOPEDIC SURGERY | Facility: CLINIC | Age: 48
End: 2023-06-30
Payer: COMMERCIAL

## 2023-06-30 DIAGNOSIS — M54.16 RADICULOPATHY, LUMBAR REGION: ICD-10-CM

## 2023-06-30 DIAGNOSIS — M25.561 PAIN IN RIGHT KNEE: ICD-10-CM

## 2023-06-30 DIAGNOSIS — M51.36 OTHER INTERVERTEBRAL DISC DEGENERATION, LUMBAR REGION: ICD-10-CM

## 2023-06-30 PROCEDURE — 99213 OFFICE O/P EST LOW 20 MIN: CPT

## 2023-06-30 NOTE — PHYSICAL EXAM
[NL (0)] : extension 0 degrees [5___] : quadriceps 5[unfilled]/5 [Negative] : negative Maria Luisa's [Right] : right knee [AP] : anteroposterior [Lateral] : lateral [There are no fractures, subluxations or dislocations. No significant abnormalities are seen] : There are no fractures, subluxations or dislocations. No significant abnormalities are seen [] : negative Lachmann [TWNoteComboBox7] : flexion 125 degrees

## 2023-06-30 NOTE — HISTORY OF PRESENT ILLNESS
[Gradual] : gradual [Constant] : constant [de-identified] : 6/30/23: f/u right knee, continued pain and symptoms, anterior patella, radiates intermittently laterally - peroneal/fibularis.  No relief with R knee csi last visit of Gabapentin.  Pain qhs is terrible. \par 6/2/23: Has anterior knee pain past few weeks, avi bad at night when trying to go to sleep. No injury. Did have recent back surgery for HNP with right sciatica, but this pain is different than the sciatica he had. No clicking, locking or giving way [] : no [FreeTextEntry1] : right knee [FreeTextEntry3] : last week [FreeTextEntry5] : patient has been feeling increasing pain and weakness  [FreeTextEntry6] : numbness/weakness

## 2023-06-30 NOTE — DISCUSSION/SUMMARY
[de-identified] : It is unclear where his pain is coming from. Likely related to his back - poor response to knee cortisone injection and Gabapentin for night time use. Will try MDP \par Will get updated MRI Lumbar spine, he will see  (his spine surgeon) for review MRI Lumbar spine, possible spine injections. \par MRI R knee to evaluate tear/pathology.  \par Return for MRI review R knee with me.

## 2025-03-23 ENCOUNTER — NON-APPOINTMENT (OUTPATIENT)
Age: 50
End: 2025-03-23